# Patient Record
Sex: MALE | Race: WHITE | NOT HISPANIC OR LATINO | Employment: OTHER | URBAN - METROPOLITAN AREA
[De-identification: names, ages, dates, MRNs, and addresses within clinical notes are randomized per-mention and may not be internally consistent; named-entity substitution may affect disease eponyms.]

---

## 2017-01-03 ENCOUNTER — TELEPHONE (OUTPATIENT)
Dept: OTOLARYNGOLOGY | Facility: CLINIC | Age: 82
End: 2017-01-03

## 2017-01-03 NOTE — TELEPHONE ENCOUNTER
I called to check on Mr. Yanes since he missed his 10 appointment today for removal of nasal packing.  He states that his wife was suppose to call to cancel.  He apologized.  He states on Friday it was bleeding so one packing was taken out and another type was put in.  Since then he has been seen at [facility] and packing has been removed.  He has been doing fine since then.  He appreciates that we called to check on him.

## 2017-01-30 ENCOUNTER — OFFICE VISIT (OUTPATIENT)
Dept: OPHTHALMOLOGY | Facility: CLINIC | Age: 82
End: 2017-01-30
Payer: MEDICARE

## 2017-01-30 DIAGNOSIS — H04.129 DRY EYE: ICD-10-CM

## 2017-01-30 DIAGNOSIS — Z96.1 PSEUDOPHAKIA: ICD-10-CM

## 2017-01-30 DIAGNOSIS — H52.7 REFRACTIVE ERROR: ICD-10-CM

## 2017-01-30 DIAGNOSIS — H40.1131 PRIMARY OPEN ANGLE GLAUCOMA OF BOTH EYES, MILD STAGE: Primary | ICD-10-CM

## 2017-01-30 PROCEDURE — 92133 CPTRZD OPH DX IMG PST SGM ON: CPT | Mod: S$GLB,,, | Performed by: OPHTHALMOLOGY

## 2017-01-30 PROCEDURE — 92012 INTRM OPH EXAM EST PATIENT: CPT | Mod: S$GLB,,, | Performed by: OPHTHALMOLOGY

## 2017-01-30 PROCEDURE — 99999 PR PBB SHADOW E&M-EST. PATIENT-LVL II: CPT | Mod: PBBFAC,,, | Performed by: OPHTHALMOLOGY

## 2017-01-30 PROCEDURE — 92015 DETERMINE REFRACTIVE STATE: CPT | Mod: S$GLB,,, | Performed by: OPHTHALMOLOGY

## 2017-01-30 NOTE — PROGRESS NOTES
SUBJECTIVE:   Yefri Yanes Jr. is a 89 y.o. male   Uncorrected distance visual acuity was 20/40 -1 in the right eye and 20/60 -1 in the left eye.   Chief Complaint   Patient presents with    Glaucoma     3 month IOP check with GOCT.pt states va has decreased doing well with drops        HPI:  HPI     Glaucoma    Additional comments: 3 month IOP check with GOCT.pt states va has   decreased doing well with drops           Comments   1. COAG (init 19/21) Goal < 16-17  SLT OS 11/6/13  2. PCIOL OU  3. Dry Eyes    Lumigan qhs ou 99% compliance       Last edited by Aida Arias MA on 1/30/2017  8:08 AM. (History)        Assessment /Plan :  1. Primary open angle glaucoma of both eyes, mild stage Doing well, IOP within acceptable range relative to target IOP and no evidence of progression. Continue current treatment. Reviewed importance of continued compliance with treatment and follow up.     2. Pseudophakia Stable   3. Dry eye Begin Systane Ultra QID OU   4. Refractive error Final Bifocal RX   Return to clinic in 3-4 months  or as needed.  With 24-2 HVF, Dilation and SDP's

## 2017-01-30 NOTE — MR AVS SNAPSHOT
[facility] - Ophthalmology  [address] [facility] [place] [address]  Phone: [phone]  Fax: [phone]                  Yefri Yanes Jr.   2017 7:45 AM   Office Visit    Description:  Male : 1926   Provider:  Antonio Valdivia MD   Department:  [facility] - [facility]           Reason for Visit     Glaucoma           Diagnoses this Visit        Comments    Primary open angle glaucoma of both eyes, mild stage    -  Primary     Pseudophakia         Dry eye         Refractive error                To Do List           Goals (5 Years of Data)     None      Follow-Up and Disposition     Return in about 4 months (around 2017).      [facility] On [facility]     [facility] Care Line -  Assistance  Registered nurses in the [facility] Call Center provide clinical advisement, health education, appointment booking, and other advisory services.  Call for this free service at [phone].             Medications           Message regarding Medications     Verify the changes and/or additions to your medication regime listed below are the same as discussed with your clinician today.  If any of these changes or additions are incorrect, please notify your healthcare provider.             Verify that the below list of medications is an accurate representation of the medications you are currently taking.  If none reported, the list may be blank. If incorrect, please contact your healthcare provider. Carry this list with you in case of emergency.           Current Medications     amiodarone (PACERONE) 200 MG Tab TAKE ONE HALF TO ONE TABLET BY MOUTH EVERY DAY OR AS DIRECTED    apixaban (ELIQUIS) 5 mg Tab TAKE (1) TABLET BY MOUTH TWICE A DAY    ascorbic acid (VITAMIN C) 1000 MG tablet Take 1,000 mg by mouth once daily.    atorvastatin (LIPITOR) 10 MG tablet Take 20 mg by mouth.    atorvastatin (LIPITOR) 40 MG tablet TAKE 1 TABLET ONCE DAILY OR AS DIRECTED    azelastine (ASTELIN) 137 mcg nasal spray      bimatoprost (LUMIGAN) 0.01 % Drop Place 1 drop into both eyes every evening.    CALCIUM PHOSPHATE TRIB/VIT D3 (CITRACAL + D ORAL) Take 1 tablet by mouth 2 (two) times daily.     celecoxib (CELEBREX) 200 MG capsule TAKE ONE CAPSULE TWO TIMES A DAY    cetirizine (ZYRTEC) 10 MG tablet Take 10 mg by mouth once daily.      cyanocobalamin-cobamamide (B-12 PLUS) 5,000-100 mcg Subl Place under the tongue once a week.    diphenhydramine-acetaminophen (TYLENOL PM)  mg Tab Take 1 tablet by mouth nightly as needed.    fluticasone (FLONASE) 50 mcg/actuation nasal spray 1 spray by Nasal route once daily.      ipratropium (ATROVENT) 0.06 % nasal spray     levothyroxine (SYNTHROID) 25 MCG tablet TAKE (1) TABLET BY MOUTH DAILY    LUMIGAN 0.01 % Drop PLACE 1 DROP INTO BOTH EYES EVERY EVENING.    metoprolol succinate (TOPROL-XL) 25 MG 24 hr tablet Take 1 tablet (25 mg total) by mouth once daily.    MULTIVITAMIN ORAL Take by mouth.    NEXIUM 40 mg capsule     ZETIA 10 mg tablet TAKE (1) TABLET BY MOUTH DAILY           Clinical Reference Information           Allergies as of 1/30/2017     Boniva [Ibandronate]    Ibandronate Sodium    Naprosyn [Naproxen]      Immunizations Administered on Date of Encounter - 1/30/2017     None      Orders Placed During Today's Visit      Normal Orders This Visit    OCT - Optic Nerve       [facility] Sign-Up     Activating your [facility] account is as easy as 1-2-3!     1) Visit [email or web address], select Sign Up Now, enter this activation code and your date of birth, then select Next.  [email or web address]  Expires: 2/13/2017 12:41 PM      2) Create a username and password to use when you visit [facility] in the future and select a security question in case you lose your password and select Next.    3) Enter your e-mail address and click Sign Up!    Additional Information  If you have questions, please e-mail [email or web address] or call [phone] to talk to our [facility] staff. Remember, [facility]  is NOT to be used for urgent needs. For medical emergencies, dial 911.

## 2017-03-02 DIAGNOSIS — I48.91 AF (ATRIAL FIBRILLATION): ICD-10-CM

## 2017-03-02 DIAGNOSIS — I25.2 OLD MI (MYOCARDIAL INFARCTION): ICD-10-CM

## 2017-03-02 RX ORDER — METOPROLOL SUCCINATE 25 MG/1
TABLET, EXTENDED RELEASE ORAL
Qty: 90 TABLET | Refills: 0 | Status: SHIPPED | OUTPATIENT
Start: 2017-03-02 | End: 2017-08-28 | Stop reason: SDUPTHER

## 2017-06-12 ENCOUNTER — OFFICE VISIT (OUTPATIENT)
Dept: OPHTHALMOLOGY | Facility: CLINIC | Age: 82
End: 2017-06-12
Payer: MEDICARE

## 2017-06-12 DIAGNOSIS — H40.1131 PRIMARY OPEN ANGLE GLAUCOMA OF BOTH EYES, MILD STAGE: Primary | ICD-10-CM

## 2017-06-12 DIAGNOSIS — Z96.1 PSEUDOPHAKIA, BOTH EYES: ICD-10-CM

## 2017-06-12 DIAGNOSIS — H52.7 REFRACTIVE ERROR: ICD-10-CM

## 2017-06-12 PROCEDURE — 92014 COMPRE OPH EXAM EST PT 1/>: CPT | Mod: S$GLB,,, | Performed by: OPHTHALMOLOGY

## 2017-06-12 PROCEDURE — 92015 DETERMINE REFRACTIVE STATE: CPT | Mod: S$GLB,,, | Performed by: OPHTHALMOLOGY

## 2017-06-12 PROCEDURE — 92083 EXTENDED VISUAL FIELD XM: CPT | Mod: S$GLB,,, | Performed by: OPHTHALMOLOGY

## 2017-06-12 PROCEDURE — 92250 FUNDUS PHOTOGRAPHY W/I&R: CPT | Mod: S$GLB,,, | Performed by: OPHTHALMOLOGY

## 2017-06-12 PROCEDURE — 99999 PR PBB SHADOW E&M-EST. PATIENT-LVL II: CPT | Mod: PBBFAC,,, | Performed by: OPHTHALMOLOGY

## 2017-06-12 NOTE — PROGRESS NOTES
SUBJECTIVE:   Yefri Yanes Jr. is a 89 y.o. male   Uncorrected distance visual acuity was 20/50 +1 in the right eye and 20/60 +1 in the left eye.   Chief Complaint   Patient presents with    Glaucoma     4 month HVF SDP chk, Lumigan QHS OU        HPI:  HPI     Glaucoma    Additional comments: 4 month HVF SDP chk, Lumigan QHS OU           Comments   Pt states that he is having a little harder of a time reading and needs   new glasses. No pain or discomfort. 100% compliant with gtts.     1. MILD COAG OS>OD (init 19/21) Goal < 16-17  SLT OS 11/6/13  2. PCIOL OU  3. Dry Eyes    Lumigan qhs ou       Last edited by Sae Carr, Patient Care Assistant on 6/12/2017  8:28   AM. (History)        Assessment /Plan :  1. Primary open angle glaucoma of both eyes, mild stage Doing well, IOP within acceptable range relative to target IOP and no evidence of progression. Continue current treatment. Reviewed importance of continued compliance with treatment and follow up.   2. Pseudophakia, both eyes stable   3. Refractive error PAL Rx       Return to clinic in 3-4 months  or as needed.  With IOP Check and GOCT

## 2017-08-28 DIAGNOSIS — I48.91 AF (ATRIAL FIBRILLATION): ICD-10-CM

## 2017-08-28 DIAGNOSIS — I25.2 OLD MI (MYOCARDIAL INFARCTION): ICD-10-CM

## 2017-08-28 RX ORDER — METOPROLOL SUCCINATE 25 MG/1
TABLET, EXTENDED RELEASE ORAL
Qty: 90 TABLET | Refills: 0 | Status: SHIPPED | OUTPATIENT
Start: 2017-08-28 | End: 2018-02-19 | Stop reason: SDUPTHER

## 2017-09-28 RX ORDER — BIMATOPROST 0.1 MG/ML
SOLUTION/ DROPS OPHTHALMIC
Qty: 2.5 ML | Refills: 12 | Status: SHIPPED | OUTPATIENT
Start: 2017-09-28 | End: 2017-10-23 | Stop reason: SDUPTHER

## 2017-10-23 ENCOUNTER — OFFICE VISIT (OUTPATIENT)
Dept: OPHTHALMOLOGY | Facility: CLINIC | Age: 82
End: 2017-10-23
Payer: MEDICARE

## 2017-10-23 DIAGNOSIS — H04.129 DRY EYE: ICD-10-CM

## 2017-10-23 DIAGNOSIS — H40.1131 PRIMARY OPEN ANGLE GLAUCOMA OF BOTH EYES, MILD STAGE: Primary | ICD-10-CM

## 2017-10-23 DIAGNOSIS — Z96.1 PSEUDOPHAKIA, BOTH EYES: ICD-10-CM

## 2017-10-23 PROCEDURE — 92012 INTRM OPH EXAM EST PATIENT: CPT | Mod: S$GLB,,, | Performed by: OPHTHALMOLOGY

## 2017-10-23 PROCEDURE — 99999 PR PBB SHADOW E&M-EST. PATIENT-LVL II: CPT | Mod: PBBFAC,,, | Performed by: OPHTHALMOLOGY

## 2017-10-23 PROCEDURE — 92133 CPTRZD OPH DX IMG PST SGM ON: CPT | Mod: S$GLB,,, | Performed by: OPHTHALMOLOGY

## 2017-10-23 RX ORDER — ATORVASTATIN CALCIUM 40 MG/1
TABLET, FILM COATED ORAL
Qty: 90 TABLET | Refills: 3 | Status: SHIPPED | OUTPATIENT
Start: 2017-10-23 | End: 2018-05-23 | Stop reason: SDUPTHER

## 2017-12-28 ENCOUNTER — OFFICE VISIT (OUTPATIENT)
Dept: OPHTHALMOLOGY | Facility: CLINIC | Age: 82
End: 2017-12-28
Payer: MEDICARE

## 2017-12-28 DIAGNOSIS — H52.7 REFRACTIVE ERROR: Primary | ICD-10-CM

## 2017-12-28 PROCEDURE — 99499 UNLISTED E&M SERVICE: CPT | Mod: S$GLB,,, | Performed by: OPHTHALMOLOGY

## 2017-12-28 PROCEDURE — 99999 PR PBB SHADOW E&M-EST. PATIENT-LVL I: CPT | Mod: PBBFAC,,, | Performed by: OPHTHALMOLOGY

## 2017-12-28 NOTE — PROGRESS NOTES
SUBJECTIVE:   Yefri Yanes Jr. is a 89 y.o. male   Corrected distance visual acuity was 20/40 -1 in the right eye and 20/40 in the left eye.   Chief Complaint   Patient presents with    Blurred Vision        HPI:  HPI     Pt c/o problems seeing to read and use computer with current PC.      1. MILD COAG OS>OD (init 19/21) Goal < 16-17  SLT OS 11/6/13(21-13)  2. PCIOL OU  3. Dry Eyes    [facility] [place] ou    Last edited by Ira Adames on 12/28/2017  2:47 PM. (History)        Assessment /Plan :  1. Refractive error  Remake PAL RX         RTC as scheduled

## 2018-02-19 DIAGNOSIS — I48.91 AF (ATRIAL FIBRILLATION): ICD-10-CM

## 2018-02-19 DIAGNOSIS — I25.2 OLD MI (MYOCARDIAL INFARCTION): ICD-10-CM

## 2018-02-19 RX ORDER — METOPROLOL SUCCINATE 25 MG/1
TABLET, EXTENDED RELEASE ORAL
Qty: 90 TABLET | Refills: 0 | Status: SHIPPED | OUTPATIENT
Start: 2018-02-19 | End: 2018-09-22 | Stop reason: SDUPTHER

## 2018-02-26 ENCOUNTER — OFFICE VISIT (OUTPATIENT)
Dept: OPHTHALMOLOGY | Facility: CLINIC | Age: 83
End: 2018-02-26
Payer: MEDICARE

## 2018-02-26 DIAGNOSIS — H40.1131 PRIMARY OPEN ANGLE GLAUCOMA OF BOTH EYES, MILD STAGE: Primary | ICD-10-CM

## 2018-02-26 DIAGNOSIS — H04.129 DRY EYE: ICD-10-CM

## 2018-02-26 DIAGNOSIS — H02.403 PTOSIS OF BOTH EYELIDS: ICD-10-CM

## 2018-02-26 DIAGNOSIS — Z96.1 PSEUDOPHAKIA, BOTH EYES: ICD-10-CM

## 2018-02-26 PROCEDURE — 99999 PR PBB SHADOW E&M-EST. PATIENT-LVL II: CPT | Mod: PBBFAC,,, | Performed by: OPHTHALMOLOGY

## 2018-02-26 PROCEDURE — 92012 INTRM OPH EXAM EST PATIENT: CPT | Mod: S$GLB,,, | Performed by: OPHTHALMOLOGY

## 2018-02-26 RX ORDER — APIXABAN 2.5 MG/1
TABLET, FILM COATED ORAL
COMMUNITY
Start: 2018-02-19 | End: 2018-08-09

## 2018-02-26 NOTE — PROGRESS NOTES
SUBJECTIVE:   Yefri Yanes Jr. is a 89 y.o. male   Corrected distance visual acuity was 20/40 +1 in the right eye and 20/40 +1 in the left eye. Comments: Used Rx through [facility].   Chief Complaint   Patient presents with    Glaucoma        HPI:  HPI     Pt states that when he is on the computer or reading a book, the object   in his vision moves. He is having trouble reading. He is still having   trouble with his glasses even after a glasses check on 12/28/17. No pain   or discomfort. 100% compliant with gtts.     1. MILD COAG OS>OD (init 19/21) Goal < 16-17  SLT OS 11/6/13(21-13)  2. PCIOL OU  3. Dry Eyes    Lumigan qhs ou    Last edited by Sae Carr, Patient Care Assistant on 2/26/2018  9:47   AM. (History)        Assessment /Plan :  1. Primary open angle glaucoma of both eyes, mild stage Doing well, IOP within acceptable range relative to target IOP and no evidence of progression. Continue current treatment. Reviewed importance of continued compliance with treatment and follow up.     2. Ptosis of both eyelids Recommend Lid Evaluation with Dr.Kyle Solis   3. Dry eye  -- Condition stable, no therapeutic change required. Monitoring routinely.     4. Pseudophakia, both eyes  -- Condition stable, no therapeutic change required. Monitoring routinely.         Return to clinic in 4 months  or as needed.  With 24-2 HVF, Dilation and SDP's

## 2018-03-13 RX ORDER — AMIODARONE HYDROCHLORIDE 200 MG/1
TABLET ORAL
Qty: 90 TABLET | Refills: 0 | Status: SHIPPED | OUTPATIENT
Start: 2018-03-13 | End: 2018-04-20 | Stop reason: DRUGHIGH

## 2018-04-20 ENCOUNTER — OFFICE VISIT (OUTPATIENT)
Dept: INTERNAL MEDICINE | Facility: CLINIC | Age: 83
End: 2018-04-20
Payer: MEDICARE

## 2018-04-20 VITALS
RESPIRATION RATE: 16 BRPM | BODY MASS INDEX: 21.22 KG/M2 | HEIGHT: 69 IN | SYSTOLIC BLOOD PRESSURE: 156 MMHG | WEIGHT: 143.31 LBS | OXYGEN SATURATION: 97 % | HEART RATE: 66 BPM | TEMPERATURE: 96 F | DIASTOLIC BLOOD PRESSURE: 80 MMHG

## 2018-04-20 DIAGNOSIS — L25.9 CONTACT DERMATITIS, UNSPECIFIED CONTACT DERMATITIS TYPE, UNSPECIFIED TRIGGER: Primary | ICD-10-CM

## 2018-04-20 PROCEDURE — 99214 OFFICE O/P EST MOD 30 MIN: CPT | Mod: S$GLB,,, | Performed by: FAMILY MEDICINE

## 2018-04-20 PROCEDURE — 99999 PR PBB SHADOW E&M-EST. PATIENT-LVL IV: CPT | Mod: PBBFAC,,, | Performed by: FAMILY MEDICINE

## 2018-04-20 RX ORDER — TRIAMCINOLONE ACETONIDE 5 MG/G
CREAM TOPICAL 2 TIMES DAILY
Qty: 15 G | Refills: 0 | Status: SHIPPED | OUTPATIENT
Start: 2018-04-20 | End: 2018-08-09

## 2018-04-20 RX ORDER — AMIODARONE HYDROCHLORIDE 100 MG/1
TABLET ORAL
COMMUNITY
Start: 2018-03-14

## 2018-04-20 NOTE — PROGRESS NOTES
Subjective:       Patient ID: Yefri Yanes Jr. is a 89 y.o. male.    Chief Complaint: Insect Bite    Pt states that rash occurred after he cam back inside from working in the yard      Rash   This is a new problem. The current episode started yesterday. The problem has been gradually worsening since onset. The affected locations include the left hand (right forearm). He was exposed to plant contact. Pertinent negatives include no cough, eye pain, facial edema, fever, shortness of breath, sore throat or vomiting. Past treatments include nothing. The treatment provided no relief.     Review of Systems   Constitutional: Negative for activity change and fever.   HENT: Negative for ear pain and sore throat.    Eyes: Negative for pain.   Respiratory: Negative for cough and shortness of breath.    Cardiovascular: Negative for chest pain.   Gastrointestinal: Negative for abdominal pain and vomiting.   Genitourinary: Negative for dysuria.   Musculoskeletal: Negative for neck pain.   Skin: Positive for rash.   Neurological: Negative for headaches.       Objective:      Physical Exam   Constitutional: He appears well-developed and well-nourished. No distress.   Pt is hard of hearing and is not wearing hearing aids today   HENT:   Head: Normocephalic and atraumatic.   Mouth/Throat: Oropharynx is clear and moist and mucous membranes are normal. No oropharyngeal exudate.   Cardiovascular: Normal rate and regular rhythm.    Pulmonary/Chest: Effort normal and breath sounds normal. No respiratory distress. He has no wheezes.   Abdominal: Soft. Bowel sounds are normal. There is no tenderness.   Neurological: He is alert.   Skin: Skin is warm and dry. No rash noted. He is not diaphoretic. No erythema.   Nursing note and vitals reviewed.      Assessment:       1. Contact dermatitis, unspecified contact dermatitis type, unspecified trigger        Plan:     Problem List Items Addressed This Visit        Derm    Contact dermatitis -  Primary    Relevant Medications    triamcinolone acetonide 0.5% (KENALOG) 0.5 % Crea

## 2018-05-23 RX ORDER — ATORVASTATIN CALCIUM 40 MG/1
TABLET, FILM COATED ORAL
Qty: 90 TABLET | Refills: 3 | Status: SHIPPED | OUTPATIENT
Start: 2018-05-23

## 2018-07-16 ENCOUNTER — TELEPHONE (OUTPATIENT)
Dept: OPHTHALMOLOGY | Facility: CLINIC | Age: 83
End: 2018-07-16

## 2018-07-16 NOTE — TELEPHONE ENCOUNTER
Received prior auth request for lumigan 0.01 for this patient. Prior auth attempted on cover my meds, and then stated prior auth not needed.    Lianna

## 2018-08-09 ENCOUNTER — OFFICE VISIT (OUTPATIENT)
Dept: OPHTHALMOLOGY | Facility: CLINIC | Age: 83
End: 2018-08-09
Payer: MEDICARE

## 2018-08-09 DIAGNOSIS — H02.403 PTOSIS OF BOTH EYELIDS: ICD-10-CM

## 2018-08-09 DIAGNOSIS — H40.1131 PRIMARY OPEN ANGLE GLAUCOMA OF BOTH EYES, MILD STAGE: Primary | ICD-10-CM

## 2018-08-09 DIAGNOSIS — Z96.1 PSEUDOPHAKIA, BOTH EYES: ICD-10-CM

## 2018-08-09 DIAGNOSIS — H04.129 DRY EYE: ICD-10-CM

## 2018-08-09 PROCEDURE — 92250 FUNDUS PHOTOGRAPHY W/I&R: CPT | Mod: S$GLB,,, | Performed by: OPHTHALMOLOGY

## 2018-08-09 PROCEDURE — 99999 PR PBB SHADOW E&M-EST. PATIENT-LVL II: CPT | Mod: PBBFAC,,, | Performed by: OPHTHALMOLOGY

## 2018-08-09 PROCEDURE — 92083 EXTENDED VISUAL FIELD XM: CPT | Mod: S$GLB,,, | Performed by: OPHTHALMOLOGY

## 2018-08-09 PROCEDURE — 92014 COMPRE OPH EXAM EST PT 1/>: CPT | Mod: S$GLB,,, | Performed by: OPHTHALMOLOGY

## 2018-08-09 RX ORDER — METOPROLOL SUCCINATE 25 MG/1
25 TABLET, EXTENDED RELEASE ORAL
COMMUNITY
Start: 2018-08-08 | End: 2018-08-09

## 2018-08-09 NOTE — PROGRESS NOTES
SUBJECTIVE:   Yefri Yanes Jr. is a 89 y.o. male   Corrected distance visual acuity was 20/60 in the right eye and 20/50 -1 in the left eye.   Chief Complaint   Patient presents with    Glaucoma     24-2 HVF, Dilation And SDP's        HPI:  HPI     Glaucoma    Additional comments: 24-2 HVF, Dilation And SDP's           Comments   June 2nd [place] Was on oxygen,   fluids. bp was around 220/180. Pt was in a hospital in [place] for 7 days.   On 3rd day he was out of atrial flutter.   Cardiologist does not recommend patient have lid surgery with Dr. Solis   at this time.   Dr. Cobos is patient's cardiologist.       1. MILD COAG OS>OD (init 19/21) Goal < 16-17  SLT OS 11/6/13(21-13)  2. PCIOL OU  3. Dry Eyes  4.Ptosis (hx of lid sx 96)    [facility] qhs ou       Last edited by Lianna Barragan MA on 8/9/2018  2:06 PM. (History)        Assessment /Plan :  1. Primary open angle glaucoma of both eyes, mild stage Doing well, IOP within acceptable range relative to target IOP and no evidence of progression. Continue current treatment. Reviewed importance of continued compliance with treatment and follow up.     2. Ptosis of both eyelids unable to proceed with Lid surgery due to pt use of eliquis unable to stop    3. Pseudophakia, both eyes  -- Condition stable, no therapeutic change required. Monitoring routinely.     4. Dry eye cont at's prn ou          Return to clinic in 3-4 months  or as needed.  With IOP Check and GOCT

## 2018-09-22 DIAGNOSIS — I48.91 AF (ATRIAL FIBRILLATION): ICD-10-CM

## 2018-09-22 DIAGNOSIS — I25.2 OLD MI (MYOCARDIAL INFARCTION): ICD-10-CM

## 2018-09-23 RX ORDER — METOPROLOL SUCCINATE 25 MG/1
TABLET, EXTENDED RELEASE ORAL
Qty: 90 TABLET | Refills: 0 | Status: SHIPPED | OUTPATIENT
Start: 2018-09-23

## 2018-11-14 RX ORDER — BIMATOPROST 0.1 MG/ML
SOLUTION/ DROPS OPHTHALMIC
Qty: 2.5 ML | Refills: 12 | Status: SHIPPED | OUTPATIENT
Start: 2018-11-14 | End: 2018-11-14 | Stop reason: SDUPTHER

## 2018-11-16 ENCOUNTER — TELEPHONE (OUTPATIENT)
Dept: OPHTHALMOLOGY | Facility: CLINIC | Age: 83
End: 2018-11-16

## 2018-11-16 NOTE — TELEPHONE ENCOUNTER
----- Message from Klarissa Claros sent at 11/16/2018 10:22 AM CST -----  Contact: pt  Edilma ( pt wife ) is requesting a call from nurse to discuss Problem with getting pt eye drops         Please call Edilma ( pt wife )back at [phone]

## 2018-11-16 NOTE — TELEPHONE ENCOUNTER
Spoke with Edilma.  Advised that Rx for lumigan was sent electronically to [facility] on 11/14/18.  Receipt was confirmed by pharmacy.  She will call pharmacy to check and call me back if there is a problem.

## 2019-01-03 ENCOUNTER — OFFICE VISIT (OUTPATIENT)
Dept: OPHTHALMOLOGY | Facility: CLINIC | Age: 84
End: 2019-01-03
Payer: MEDICARE

## 2019-01-03 DIAGNOSIS — H02.403 PTOSIS OF BOTH EYELIDS: ICD-10-CM

## 2019-01-03 DIAGNOSIS — H40.1131 PRIMARY OPEN ANGLE GLAUCOMA OF BOTH EYES, MILD STAGE: Primary | ICD-10-CM

## 2019-01-03 DIAGNOSIS — Z96.1 PSEUDOPHAKIA, BOTH EYES: ICD-10-CM

## 2019-01-03 PROCEDURE — 99999 PR PBB SHADOW E&M-EST. PATIENT-LVL II: ICD-10-PCS | Mod: PBBFAC,,, | Performed by: OPHTHALMOLOGY

## 2019-01-03 PROCEDURE — 92133 POSTERIOR SEGMENT OCT OPTIC NERVE(OCULAR COHERENCE TOMOGRAPHY) - OU - BOTH EYES: ICD-10-PCS | Mod: S$GLB,,, | Performed by: OPHTHALMOLOGY

## 2019-01-03 PROCEDURE — 99999 PR PBB SHADOW E&M-EST. PATIENT-LVL II: CPT | Mod: PBBFAC,,, | Performed by: OPHTHALMOLOGY

## 2019-01-03 PROCEDURE — 92133 CPTRZD OPH DX IMG PST SGM ON: CPT | Mod: S$GLB,,, | Performed by: OPHTHALMOLOGY

## 2019-01-03 PROCEDURE — 92012 PR EYE EXAM, EST PATIENT,INTERMED: ICD-10-PCS | Mod: S$GLB,,, | Performed by: OPHTHALMOLOGY

## 2019-01-03 PROCEDURE — 92012 INTRM OPH EXAM EST PATIENT: CPT | Mod: S$GLB,,, | Performed by: OPHTHALMOLOGY

## 2019-01-03 NOTE — PROGRESS NOTES
SUBJECTIVE:   Yefri Yanes Jr. is a 89 y.o. male   Visual acuity comments: Forgot glasses at home.   Chief Complaint   Patient presents with    Glaucoma     4 month IOP check and GOCT        HPI:  HPI     Glaucoma      Additional comments: 4 month IOP check and GOCT              Comments     The patient states his eyes are feeling okay.  100% drop compliance    1. MILD COAG OS>OD (init 19/21) Goal < 16-17  SLT OS 11/6/13(21-13)  2. PCIOL OU  3. Dry Eyes  4.Ptosis (hx of lid sx 96)    Lumigan qhs ou          Last edited by Kriss Cadena on 1/3/2019  2:54 PM. (History)        Assessment /Plan :  1. Primary open angle glaucoma of both eyes, mild stage Doing well, IOP within acceptable range relative to target IOP and no evidence of progression. Continue current treatment. Reviewed importance of continued compliance with treatment and follow up.     2. Pseudophakia, both eyes  -- Condition stable, no therapeutic change required. Monitoring routinely.     3. Ptosis of both eyelids monitor for now     Return to clinic in 4 months  or as needed.  With IOP Check

## 2019-08-30 ENCOUNTER — LAB VISIT (OUTPATIENT)
Dept: LAB | Facility: HOSPITAL | Age: 84
End: 2019-08-30
Attending: INTERNAL MEDICINE
Payer: MEDICARE

## 2019-08-30 DIAGNOSIS — M62.81 MUSCLE WEAKNESS (GENERALIZED): Primary | ICD-10-CM

## 2019-08-30 LAB
ALBUMIN SERPL BCP-MCNC: 3 G/DL (ref 3.5–5.2)
ALP SERPL-CCNC: 108 U/L (ref 55–135)
ALT SERPL W/O P-5'-P-CCNC: 28 U/L (ref 10–44)
ANION GAP SERPL CALC-SCNC: 9 MMOL/L (ref 8–16)
AST SERPL-CCNC: 21 U/L (ref 10–40)
BASOPHILS # BLD AUTO: 0.01 K/UL (ref 0–0.2)
BASOPHILS NFR BLD: 0.1 % (ref 0–1.9)
BILIRUB SERPL-MCNC: 0.5 MG/DL (ref 0.1–1)
BNP SERPL-MCNC: 217 PG/ML (ref 0–99)
BUN SERPL-MCNC: 17 MG/DL (ref 10–30)
CALCIUM SERPL-MCNC: 9.2 MG/DL (ref 8.7–10.5)
CHLORIDE SERPL-SCNC: 100 MMOL/L (ref 95–110)
CO2 SERPL-SCNC: 25 MMOL/L (ref 23–29)
CREAT SERPL-MCNC: 1 MG/DL (ref 0.5–1.4)
DIFFERENTIAL METHOD: ABNORMAL
EOSINOPHIL # BLD AUTO: 0.3 K/UL (ref 0–0.5)
EOSINOPHIL NFR BLD: 3.1 % (ref 0–8)
ERYTHROCYTE [DISTWIDTH] IN BLOOD BY AUTOMATED COUNT: 13.8 % (ref 11.5–14.5)
ERYTHROCYTE [SEDIMENTATION RATE] IN BLOOD BY WESTERGREN METHOD: 53 MM/HR (ref 0–23)
EST. GFR  (AFRICAN AMERICAN): >60 ML/MIN/1.73 M^2
EST. GFR  (NON AFRICAN AMERICAN): >60 ML/MIN/1.73 M^2
GLUCOSE SERPL-MCNC: 118 MG/DL (ref 70–110)
HCT VFR BLD AUTO: 33.8 % (ref 40–54)
HGB BLD-MCNC: 10.6 G/DL (ref 14–18)
LYMPHOCYTES # BLD AUTO: 1.7 K/UL (ref 1–4.8)
LYMPHOCYTES NFR BLD: 21.1 % (ref 18–48)
MCH RBC QN AUTO: 30 PG (ref 27–31)
MCHC RBC AUTO-ENTMCNC: 31.4 G/DL (ref 32–36)
MCV RBC AUTO: 96 FL (ref 82–98)
MONOCYTES # BLD AUTO: 1.3 K/UL (ref 0.3–1)
MONOCYTES NFR BLD: 15.7 % (ref 4–15)
NEUTROPHILS # BLD AUTO: 4.9 K/UL (ref 1.8–7.7)
NEUTROPHILS NFR BLD: 60 % (ref 38–73)
PLATELET # BLD AUTO: 304 K/UL (ref 150–350)
PMV BLD AUTO: 9.3 FL (ref 9.2–12.9)
POTASSIUM SERPL-SCNC: 4.4 MMOL/L (ref 3.5–5.1)
PROT SERPL-MCNC: 5.7 G/DL (ref 6–8.4)
RBC # BLD AUTO: 3.53 M/UL (ref 4.6–6.2)
SODIUM SERPL-SCNC: 134 MMOL/L (ref 136–145)
TSH SERPL DL<=0.005 MIU/L-ACNC: 3.91 UIU/ML (ref 0.4–4)
WBC # BLD AUTO: 8.14 K/UL (ref 3.9–12.7)

## 2019-08-30 PROCEDURE — 83880 ASSAY OF NATRIURETIC PEPTIDE: CPT | Mod: PO

## 2019-08-30 PROCEDURE — 80053 COMPREHEN METABOLIC PANEL: CPT | Mod: PO

## 2019-08-30 PROCEDURE — 84443 ASSAY THYROID STIM HORMONE: CPT | Mod: PO

## 2019-08-30 PROCEDURE — 85025 COMPLETE CBC W/AUTO DIFF WBC: CPT | Mod: PO

## 2019-08-30 PROCEDURE — 85652 RBC SED RATE AUTOMATED: CPT

## 2020-01-07 RX ORDER — BIMATOPROST 0.1 MG/ML
SOLUTION/ DROPS OPHTHALMIC
Qty: 2.5 ML | Refills: 0 | Status: SHIPPED | OUTPATIENT
Start: 2020-01-07 | End: 2020-02-04

## 2020-01-13 ENCOUNTER — OFFICE VISIT (OUTPATIENT)
Dept: OPHTHALMOLOGY | Facility: CLINIC | Age: 85
End: 2020-01-13
Payer: MEDICARE

## 2020-01-13 DIAGNOSIS — H04.129 DRY EYE: ICD-10-CM

## 2020-01-13 DIAGNOSIS — Z96.1 PSEUDOPHAKIA, BOTH EYES: ICD-10-CM

## 2020-01-13 DIAGNOSIS — H26.492 LEFT POSTERIOR CAPSULAR OPACIFICATION: ICD-10-CM

## 2020-01-13 DIAGNOSIS — H02.403 PTOSIS OF BOTH EYELIDS: ICD-10-CM

## 2020-01-13 DIAGNOSIS — H40.1131 PRIMARY OPEN ANGLE GLAUCOMA OF BOTH EYES, MILD STAGE: Primary | ICD-10-CM

## 2020-01-13 PROCEDURE — 99999 PR PBB SHADOW E&M-EST. PATIENT-LVL I: ICD-10-PCS | Mod: PBBFAC,,, | Performed by: OPHTHALMOLOGY

## 2020-01-13 PROCEDURE — 66821 YAG CAPSULOTOMY - OD - RIGHT EYE: ICD-10-PCS | Mod: LT,S$GLB,, | Performed by: OPHTHALMOLOGY

## 2020-01-13 PROCEDURE — 66821 AFTER CATARACT LASER SURGERY: CPT | Mod: LT,S$GLB,, | Performed by: OPHTHALMOLOGY

## 2020-01-13 PROCEDURE — 99999 PR PBB SHADOW E&M-EST. PATIENT-LVL I: CPT | Mod: PBBFAC,,, | Performed by: OPHTHALMOLOGY

## 2020-01-13 PROCEDURE — 92133 POSTERIOR SEGMENT OCT OPTIC NERVE(OCULAR COHERENCE TOMOGRAPHY) - OU - BOTH EYES: ICD-10-PCS | Mod: S$GLB,,, | Performed by: OPHTHALMOLOGY

## 2020-01-13 PROCEDURE — 92012 INTRM OPH EXAM EST PATIENT: CPT | Mod: 57,S$GLB,, | Performed by: OPHTHALMOLOGY

## 2020-01-13 PROCEDURE — 92133 CPTRZD OPH DX IMG PST SGM ON: CPT | Mod: S$GLB,,, | Performed by: OPHTHALMOLOGY

## 2020-01-13 PROCEDURE — 92012 PR EYE EXAM, EST PATIENT,INTERMED: ICD-10-PCS | Mod: 57,S$GLB,, | Performed by: OPHTHALMOLOGY

## 2020-01-13 RX ORDER — PREDNISOLONE ACETATE 10 MG/ML
1 SUSPENSION/ DROPS OPHTHALMIC 4 TIMES DAILY
Qty: 10 ML | Refills: 2 | Status: SHIPPED | OUTPATIENT
Start: 2020-01-13 | End: 2020-02-12

## 2020-01-13 NOTE — PROGRESS NOTES
SUBJECTIVE:   Yefri Yanes Jr. is a 89 y.o. male   Corrected distance visual acuity was 20/50 - in the right eye and 20/40 -2 in the left eye.   Chief Complaint   Patient presents with    Glaucoma        HPI:  HPI     Pt lost to f/u and was to rtc 5/2019     Here today for IOP and GOCT, pt was in and out of hospital and rehab   lately  and missed meds when in hospital they didn't give meds (was in   hospital for salmonella poisoning , then pancreatitis, then went to rehab,   etc)   Pt caregiver says that now for the last 3 months he's been back on the   lumigan     1. MILD COAG OS>OD (init 19/21) Goal < 16-17  SLT OS 11/6/13(21-13)  2. PCIOL OU  3. Dry Eyes  4.Ptosis (hx of lid sx 96)    [facility] ou    Last edited by Vaishali Strauss [facility] on 1/13/2020  1:44 PM. (History)        Assessment /Plan :  1. Primary open angle glaucoma of both eyes, mild stage Doing well, IOP within acceptable range relative to target IOP and no evidence of progression. Continue current treatment. Reviewed importance of continued compliance with treatment and follow up.     2. Pseudophakia, both eyes  -- Condition stable, no therapeutic change required. Monitoring routinely.     3. Ptosis of both eyelids  -- Condition stable, no therapeutic change required. Monitoring routinely.     4. Dry eye AT'S qid ou    5. Left posterior capsular opacification Yag Capsulotomy Procedure:    89 y.o. year old patient experiencing a symptomatic decrease in vision OS with inability to perform activities of daily living including reading.    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 Including risks of retinal detachment (1-3%), macular edema, dislocated implant, pain, inflammation elevated intraocular pressure and vision loss. Consent signed.    Medications given:  Apraclonidine gtt  Tetracaine gtt    Laser energy settings:  2.4  mJ / burst  59   bursts  IOP:14  IMPRESSION:  Yag Capsulotomy OS well tolerated    PLAN:  1. Prednisolone 1% QID over 1 week  2. Postoperative precautions discussed  3. RTC 2-3 weeks or prn

## 2020-02-04 RX ORDER — BIMATOPROST 0.1 MG/ML
SOLUTION/ DROPS OPHTHALMIC
Qty: 2.5 ML | Refills: 5 | Status: SHIPPED | OUTPATIENT
Start: 2020-02-04 | End: 2020-10-26

## 2020-02-10 ENCOUNTER — OFFICE VISIT (OUTPATIENT)
Dept: OPHTHALMOLOGY | Facility: CLINIC | Age: 85
End: 2020-02-10
Payer: MEDICARE

## 2020-02-10 DIAGNOSIS — Z98.890 POST-OPERATIVE STATE: Primary | ICD-10-CM

## 2020-02-10 PROCEDURE — 99999 PR PBB SHADOW E&M-EST. PATIENT-LVL II: CPT | Mod: PBBFAC,,, | Performed by: OPHTHALMOLOGY

## 2020-02-10 PROCEDURE — 99999 PR PBB SHADOW E&M-EST. PATIENT-LVL II: ICD-10-PCS | Mod: PBBFAC,,, | Performed by: OPHTHALMOLOGY

## 2020-02-10 PROCEDURE — 99024 POSTOP FOLLOW-UP VISIT: CPT | Mod: S$GLB,,, | Performed by: OPHTHALMOLOGY

## 2020-02-10 PROCEDURE — 99024 PR POST-OP FOLLOW-UP VISIT: ICD-10-PCS | Mod: S$GLB,,, | Performed by: OPHTHALMOLOGY

## 2020-06-12 ENCOUNTER — OFFICE VISIT (OUTPATIENT)
Dept: OPHTHALMOLOGY | Facility: CLINIC | Age: 85
End: 2020-06-12
Payer: MEDICARE

## 2020-06-12 DIAGNOSIS — Z96.1 PSEUDOPHAKIA, BOTH EYES: ICD-10-CM

## 2020-06-12 DIAGNOSIS — H04.129 DRY EYE: ICD-10-CM

## 2020-06-12 DIAGNOSIS — H02.403 PTOSIS OF BOTH EYELIDS: ICD-10-CM

## 2020-06-12 DIAGNOSIS — H40.1131 PRIMARY OPEN ANGLE GLAUCOMA OF BOTH EYES, MILD STAGE: Primary | ICD-10-CM

## 2020-06-12 PROCEDURE — 92083 HUMPHREY VISUAL FIELD - OU - BOTH EYES: ICD-10-PCS | Mod: S$GLB,,, | Performed by: OPHTHALMOLOGY

## 2020-06-12 PROCEDURE — 99999 PR PBB SHADOW E&M-EST. PATIENT-LVL I: CPT | Mod: PBBFAC,,, | Performed by: OPHTHALMOLOGY

## 2020-06-12 PROCEDURE — 92012 INTRM OPH EXAM EST PATIENT: CPT | Mod: S$GLB,,, | Performed by: OPHTHALMOLOGY

## 2020-06-12 PROCEDURE — 99999 PR PBB SHADOW E&M-EST. PATIENT-LVL I: ICD-10-PCS | Mod: PBBFAC,,, | Performed by: OPHTHALMOLOGY

## 2020-06-12 PROCEDURE — 92012 PR EYE EXAM, EST PATIENT,INTERMED: ICD-10-PCS | Mod: S$GLB,,, | Performed by: OPHTHALMOLOGY

## 2020-06-12 PROCEDURE — 92083 EXTENDED VISUAL FIELD XM: CPT | Mod: S$GLB,,, | Performed by: OPHTHALMOLOGY

## 2020-06-12 NOTE — PROGRESS NOTES
SUBJECTIVE  Yefri Yanes Jr. is 89 y.o. male  Corrected distance visual acuity was 20/50 in the right eye and 20/40 -2 in the left eye.   Chief Complaint   Patient presents with    Glaucoma          HPI     Pt here for 4 month iop check with HVF     1. MILD COAG OS>OD (init 19/21) Goal < 16-17  SLT OS 11/6/13(21-13)  2. PCIOL OU  YAG OS 1/13/2020  3. Dry Eyes  4.Ptosis (hx of lid sx 96)    Lumigan qhs ou    Last edited by Aida Arias MA on 6/12/2020 11:48 AM. (History)         Assessment /Plan :  1. Primary open angle glaucoma of both eyes, mild stage Doing well, IOP within acceptable range relative to target IOP and no evidence of progression. Continue current treatment. Reviewed importance of continued compliance with treatment and follow up.     2. Pseudophakia, both eyes  -- Condition stable, no therapeutic change required. Monitoring routinely.     3. Ptosis of both eyelids monitor for now   4. Dry eye recommend artificial tears OU prn     Return to clinic in 4 months  or as needed.  With IOP Check and GOCT

## 2022-01-01 ENCOUNTER — HOSPITAL ENCOUNTER (EMERGENCY)
Facility: HOSPITAL | Age: 87
Discharge: ANOTHER HOSPITAL | End: 2022-01-02
Attending: EMERGENCY MEDICINE
Payer: MEDICARE

## 2022-01-01 DIAGNOSIS — S27.321A CONTUSION OF RIGHT LUNG, INITIAL ENCOUNTER: ICD-10-CM

## 2022-01-01 DIAGNOSIS — J90 PLEURAL EFFUSION: ICD-10-CM

## 2022-01-01 DIAGNOSIS — S22.41XA CLOSED FRACTURE OF MULTIPLE RIBS OF RIGHT SIDE, INITIAL ENCOUNTER: Primary | ICD-10-CM

## 2022-01-01 PROCEDURE — 63600175 PHARM REV CODE 636 W HCPCS: Mod: ER | Performed by: EMERGENCY MEDICINE

## 2022-01-01 PROCEDURE — 99285 EMERGENCY DEPT VISIT HI MDM: CPT | Mod: 25,ER

## 2022-01-01 PROCEDURE — 96374 THER/PROPH/DIAG INJ IV PUSH: CPT | Mod: ER

## 2022-01-01 RX ORDER — LABETALOL HYDROCHLORIDE 5 MG/ML
10 INJECTION, SOLUTION INTRAVENOUS
Status: CANCELLED | OUTPATIENT
Start: 2022-01-02 | End: 2022-01-02

## 2022-01-01 RX ORDER — MORPHINE SULFATE 4 MG/ML
2 INJECTION, SOLUTION INTRAMUSCULAR; INTRAVENOUS
Status: COMPLETED | OUTPATIENT
Start: 2022-01-01 | End: 2022-01-01

## 2022-01-01 RX ORDER — LABETALOL HYDROCHLORIDE 5 MG/ML
10 INJECTION, SOLUTION INTRAVENOUS
Status: COMPLETED | OUTPATIENT
Start: 2022-01-02 | End: 2022-01-02

## 2022-01-01 RX ADMIN — MORPHINE SULFATE 2 MG: 4 INJECTION INTRAVENOUS at 11:01

## 2022-01-02 VITALS
DIASTOLIC BLOOD PRESSURE: 93 MMHG | WEIGHT: 120 LBS | BODY MASS INDEX: 17.72 KG/M2 | RESPIRATION RATE: 22 BRPM | TEMPERATURE: 98 F | HEART RATE: 70 BPM | SYSTOLIC BLOOD PRESSURE: 170 MMHG | OXYGEN SATURATION: 95 %

## 2022-01-02 LAB
ALBUMIN SERPL BCP-MCNC: 3.6 G/DL (ref 3.5–5.2)
ALP SERPL-CCNC: 68 U/L (ref 55–135)
ALT SERPL W/O P-5'-P-CCNC: 19 U/L (ref 10–44)
ANION GAP SERPL CALC-SCNC: 10 MMOL/L (ref 8–16)
AST SERPL-CCNC: 20 U/L (ref 10–40)
BASOPHILS # BLD AUTO: 0.04 K/UL (ref 0–0.2)
BASOPHILS NFR BLD: 0.4 % (ref 0–1.9)
BILIRUB SERPL-MCNC: 1 MG/DL (ref 0.1–1)
BILIRUB UR QL STRIP: NEGATIVE
BUN SERPL-MCNC: 18 MG/DL (ref 10–30)
CALCIUM SERPL-MCNC: 9.3 MG/DL (ref 8.7–10.5)
CHLORIDE SERPL-SCNC: 102 MMOL/L (ref 95–110)
CLARITY UR REFRACT.AUTO: CLEAR
CO2 SERPL-SCNC: 25 MMOL/L (ref 23–29)
COLOR UR AUTO: YELLOW
CREAT SERPL-MCNC: 1.1 MG/DL (ref 0.5–1.4)
CTP QC/QA: YES
DIFFERENTIAL METHOD: ABNORMAL
EOSINOPHIL # BLD AUTO: 0 K/UL (ref 0–0.5)
EOSINOPHIL NFR BLD: 0 % (ref 0–8)
ERYTHROCYTE [DISTWIDTH] IN BLOOD BY AUTOMATED COUNT: 13.8 % (ref 11.5–14.5)
EST. GFR  (AFRICAN AMERICAN): >60 ML/MIN/1.73 M^2
EST. GFR  (NON AFRICAN AMERICAN): 56.8 ML/MIN/1.73 M^2
GLUCOSE SERPL-MCNC: 153 MG/DL (ref 70–110)
GLUCOSE UR QL STRIP: NEGATIVE
HCT VFR BLD AUTO: 41.5 % (ref 40–54)
HGB BLD-MCNC: 13.5 G/DL (ref 14–18)
HGB UR QL STRIP: NEGATIVE
IMM GRANULOCYTES # BLD AUTO: 0.06 K/UL (ref 0–0.04)
IMM GRANULOCYTES NFR BLD AUTO: 0.5 % (ref 0–0.5)
KETONES UR QL STRIP: NEGATIVE
LEUKOCYTE ESTERASE UR QL STRIP: NEGATIVE
LYMPHOCYTES # BLD AUTO: 1.6 K/UL (ref 1–4.8)
LYMPHOCYTES NFR BLD: 14.1 % (ref 18–48)
MCH RBC QN AUTO: 31.5 PG (ref 27–31)
MCHC RBC AUTO-ENTMCNC: 32.5 G/DL (ref 32–36)
MCV RBC AUTO: 97 FL (ref 82–98)
MONOCYTES # BLD AUTO: 1.2 K/UL (ref 0.3–1)
MONOCYTES NFR BLD: 10.8 % (ref 4–15)
NEUTROPHILS # BLD AUTO: 8.3 K/UL (ref 1.8–7.7)
NEUTROPHILS NFR BLD: 74.2 % (ref 38–73)
NITRITE UR QL STRIP: NEGATIVE
NRBC BLD-RTO: 0 /100 WBC
PH UR STRIP: 7 [PH] (ref 5–8)
PLATELET # BLD AUTO: 161 K/UL (ref 150–450)
PMV BLD AUTO: 10.1 FL (ref 9.2–12.9)
POTASSIUM SERPL-SCNC: 4.2 MMOL/L (ref 3.5–5.1)
PROT SERPL-MCNC: 6.1 G/DL (ref 6–8.4)
PROT UR QL STRIP: NEGATIVE
RBC # BLD AUTO: 4.28 M/UL (ref 4.6–6.2)
SARS-COV-2 RDRP RESP QL NAA+PROBE: NEGATIVE
SODIUM SERPL-SCNC: 137 MMOL/L (ref 136–145)
SP GR UR STRIP: 1.02 (ref 1–1.03)
URN SPEC COLLECT METH UR: NORMAL
UROBILINOGEN UR STRIP-ACNC: NEGATIVE EU/DL
WBC # BLD AUTO: 11.2 K/UL (ref 3.9–12.7)

## 2022-01-02 PROCEDURE — 81003 URINALYSIS AUTO W/O SCOPE: CPT | Mod: ER | Performed by: EMERGENCY MEDICINE

## 2022-01-02 PROCEDURE — 63600175 PHARM REV CODE 636 W HCPCS: Mod: ER | Performed by: EMERGENCY MEDICINE

## 2022-01-02 PROCEDURE — U0002 COVID-19 LAB TEST NON-CDC: HCPCS | Mod: ER | Performed by: EMERGENCY MEDICINE

## 2022-01-02 PROCEDURE — 27000221 HC OXYGEN, UP TO 24 HOURS: Mod: ER

## 2022-01-02 PROCEDURE — 96376 TX/PRO/DX INJ SAME DRUG ADON: CPT | Mod: ER

## 2022-01-02 PROCEDURE — 96375 TX/PRO/DX INJ NEW DRUG ADDON: CPT | Mod: ER

## 2022-01-02 PROCEDURE — 85025 COMPLETE CBC W/AUTO DIFF WBC: CPT | Mod: ER | Performed by: EMERGENCY MEDICINE

## 2022-01-02 PROCEDURE — 25000003 PHARM REV CODE 250: Mod: ER | Performed by: EMERGENCY MEDICINE

## 2022-01-02 PROCEDURE — 80053 COMPREHEN METABOLIC PANEL: CPT | Mod: ER | Performed by: EMERGENCY MEDICINE

## 2022-01-02 RX ORDER — MORPHINE SULFATE 4 MG/ML
2 INJECTION, SOLUTION INTRAMUSCULAR; INTRAVENOUS
Status: COMPLETED | OUTPATIENT
Start: 2022-01-02 | End: 2022-01-02

## 2022-01-02 RX ORDER — SODIUM CHLORIDE 9 MG/ML
INJECTION, SOLUTION INTRAVENOUS
Status: COMPLETED | OUTPATIENT
Start: 2022-01-02 | End: 2022-01-02

## 2022-01-02 RX ORDER — LABETALOL HYDROCHLORIDE 5 MG/ML
20 INJECTION, SOLUTION INTRAVENOUS
Status: COMPLETED | OUTPATIENT
Start: 2022-01-02 | End: 2022-01-02

## 2022-01-02 RX ORDER — ONDANSETRON 2 MG/ML
4 INJECTION INTRAMUSCULAR; INTRAVENOUS
Status: COMPLETED | OUTPATIENT
Start: 2022-01-02 | End: 2022-01-02

## 2022-01-02 RX ADMIN — MORPHINE SULFATE 2 MG: 4 INJECTION INTRAVENOUS at 04:01

## 2022-01-02 RX ADMIN — LABETALOL HYDROCHLORIDE 20 MG: 5 INJECTION, SOLUTION INTRAVENOUS at 02:01

## 2022-01-02 RX ADMIN — ONDANSETRON HYDROCHLORIDE 4 MG: 2 SOLUTION INTRAMUSCULAR; INTRAVENOUS at 06:01

## 2022-01-02 RX ADMIN — SODIUM CHLORIDE: 0.9 INJECTION, SOLUTION INTRAVENOUS at 12:01

## 2022-01-02 RX ADMIN — LABETALOL HYDROCHLORIDE 10 MG: 5 INJECTION, SOLUTION INTRAVENOUS at 12:01

## 2022-01-02 RX ADMIN — ONDANSETRON 4 MG: 2 INJECTION INTRAMUSCULAR; INTRAVENOUS at 06:01

## 2022-01-02 NOTE — ED PROVIDER NOTES
Encounter Date: 1/1/2022       History     Chief Complaint   Patient presents with    Fall     C/o falling earlier today, hitting right side of back on wooden end table. + blood thinners, hx Afib. C/o lower back, right side rib pain     Chief complaint:  Right rib pain/back pain    History of present illness:  89-year-old male was in his closet at home and fell striking a small table in the closet.  He fell directly against his right posterior ribs.  Complaint specifically a right sided back pain at the 8 through 12 rib area posteriorly.  Denies any midline back pain.  Denies any neck pain.  denies any syncopal or presyncopal symptoms.  this was a trip and fall.  No complaints of head injury.  pain severity 10/10 at the worst currently 3/10.  Received fentanyl in route.        Review of patient's allergies indicates:   Allergen Reactions    Boniva [ibandronate] Swelling    Ibandronate sodium Swelling    Naprosyn [naproxen]      Past Medical History:   Diagnosis Date    AR (allergic rhinitis) 12/5/2012    Arthritis     Dementia     Dry eyes     GE reflux 5/12/2013    Glaucoma     Hypertension     Hypothyroidism 12/5/2012    Old MI (myocardial infarction) 5/12/2013     Past Surgical History:   Procedure Laterality Date    EYE SURGERY      HERNIA REPAIR      SPINE SURGERY      TONSILLECTOMY       Family History   Problem Relation Age of Onset    Diabetes Father     Blindness Neg Hx     Glaucoma Neg Hx     Macular degeneration Neg Hx     Retinal detachment Neg Hx     Strabismus Neg Hx      Social History     Tobacco Use    Smoking status: Never Smoker    Smokeless tobacco: Never Used   Substance Use Topics    Alcohol use: No    Drug use: No     Review of Systems   Constitutional: Negative for activity change and appetite change.   HENT: Negative for nosebleeds.    Eyes: Negative for visual disturbance.   Respiratory: Negative for cough, chest tightness and shortness of breath.     Cardiovascular: Negative for chest pain.   Gastrointestinal: Negative for abdominal distention, abdominal pain, nausea and vomiting.   Genitourinary: Negative for difficulty urinating, frequency and hematuria.   Musculoskeletal: Positive for back pain (Right flank). Negative for neck pain and neck stiffness.   Skin: Negative.    Neurological: Negative for dizziness, syncope and weakness.   Hematological: Negative for adenopathy.   Psychiatric/Behavioral: Negative.    All other systems reviewed and are negative.      Physical Exam     Initial Vitals [01/01/22 2152]   BP Pulse Resp Temp SpO2   (!) 187/109 77 19 97.8 °F (36.6 °C) 95 %      MAP       --         Physical Exam    Nursing note and vitals reviewed.  Constitutional: He appears well-nourished. No distress.   HENT:   Head: Normocephalic and atraumatic.   Right Ear: External ear normal.   Left Ear: External ear normal.   Mouth/Throat: Oropharynx is clear and moist.   Eyes: Conjunctivae and EOM are normal. Pupils are equal, round, and reactive to light.   Neck: Neck supple.   Normal range of motion.  Cardiovascular: Normal rate, regular rhythm and intact distal pulses.   Pulmonary/Chest: No respiratory distress. He has no wheezes. He has no rhonchi. He has no rales. He exhibits tenderness (Right posterolateral ribs 8 through 12).   Abdominal: He exhibits no distension. There is no abdominal tenderness.   Musculoskeletal:         General: No edema. Normal range of motion.      Cervical back: Normal range of motion and neck supple.     Neurological:   Drowsy secondary to analgesia   Skin: Skin is warm and dry. Capillary refill takes 2 to 3 seconds.   No ecchymoses or bruising noted.   Psychiatric: His behavior is normal.         ED Course   Procedures  Labs Reviewed - No data to display       Imaging Results           CT Chest Without Contrast (Final result)  Result time 01/01/22 23:40:04    Final result by Ed Robbins MD (01/01/22 23:40:04)                  Impression:      Acute right 8th-10th rib fractures with associated small right pleural fluid.  Hemorrhagic pleural fluid not excluded.    Additional findings as above.    This report was flagged in Epic as abnormal.    All CT scans at this facility are performed  using dose modulation techniques as appropriate to performed exam including the following:  automated exposure control; adjustment of mA and/or kV according to the patients size (this includes techniques or standardized protocols for targeted exams where dose is matched to indication/reason for exam: i.e. extremities or head);  iterative reconstruction technique.      Electronically signed by: Ed Robbins  Date:    01/01/2022  Time:    23:40             Narrative:    EXAMINATION:  CT CHEST WITHOUT CONTRAST    CLINICAL HISTORY:  Chest trauma, blunt;    TECHNIQUE:  Low dose axial images, sagittal and coronal reformations were obtained from the thoracic inlet to the lung bases. Contrast was not administered.    COMPARISON:  None    FINDINGS:  Base of Neck: No significant abnormality.    Thoracic soft tissues: Normal.    Aorta: Left-sided aortic arch.  No aneurysm and no significant atherosclerosis    Heart: Heart is enlarged.  There is coronary artery atherosclerosis.  No pericardial effusion.    Pulmonary vasculature: Pulmonary arteries distribute normally.  There are four pulmonary veins.    Miriam/Mediastinum: No pathologic linda enlargement.    Airways: Patent.    Lungs/Pleura: Small right and trivial left pleural effusions.  Associated atelectasis.  No consolidative opacities specifically concerning for infection.  No pneumothorax.    Esophagus: Normal.    Upper Abdomen: Probable right renal simple cyst in the pelvis.  There is a small posterior soft tissue nodule as seen on series 4, image 418 measuring 1.5 cm.  It is unclear if this represents a lobulated area of pleural fluid, hemorrhage, or other etiology.  Neoplasm not excluded.  Suggest  follow-up once the patient's acute symptoms have resolved.    Bones: Multifocal osseous degenerative changes.    Acute left 10th rib fracture in 2 locations.  Acute displaced right 9th rib fracture.  Acute displaced right 8th rib fracture.                                 Medications   morphine injection 2 mg (2 mg Intravenous Given 1/1/22 7851)   labetaloL injection 10 mg (10 mg Intravenous Given 1/2/22 0007)     Medical Decision Making:   Clinical Tests:   Lab Tests: Ordered and Reviewed  Radiological Study: Ordered and Reviewed  ED Management:  Examination consistent with fractured ribs.  CT scan reveals 3 displaced rib fractures 8 9 in 10 on the right side associated with a pleural effusion suggesting pulmonary contusion.  No pneumothorax appreciated.    Advised the spouse regarding the patient's condition and need for evaluation for overnight stay to determine the progress or lack thereof of the effusion.  [facility] eye request a [facility].  [facility] saturation and [facility] was available and I spoke with Dr. Overton the emergency department physician and accepts for transfer to that facility.  Patient will be transported by [facility] monitor oxygenation and vital signs EN route.                      Clinical Impression:             ICD-10-CM ICD-9-CM   1. Closed fracture of multiple ribs of right side, initial encounter  S22.41XA 807.09   2. Pleural effusion  J90 511.9   3. Contusion of right lung, initial encounter  S27.321A 861.21          Kamar Diamond MD  01/02/22 0206